# Patient Record
Sex: FEMALE | Race: WHITE | NOT HISPANIC OR LATINO | Employment: UNEMPLOYED | URBAN - METROPOLITAN AREA
[De-identification: names, ages, dates, MRNs, and addresses within clinical notes are randomized per-mention and may not be internally consistent; named-entity substitution may affect disease eponyms.]

---

## 2020-01-07 DIAGNOSIS — K21.9 GASTROESOPHAGEAL REFLUX DISEASE WITHOUT ESOPHAGITIS: Primary | ICD-10-CM

## 2020-01-07 RX ORDER — OMEPRAZOLE 20 MG/1
CAPSULE, DELAYED RELEASE ORAL
Qty: 90 CAPSULE | Refills: 0 | Status: SHIPPED | OUTPATIENT
Start: 2020-01-07 | End: 2020-04-07

## 2020-04-06 DIAGNOSIS — K21.9 GASTROESOPHAGEAL REFLUX DISEASE WITHOUT ESOPHAGITIS: ICD-10-CM

## 2020-04-07 RX ORDER — OMEPRAZOLE 20 MG/1
CAPSULE, DELAYED RELEASE ORAL
Qty: 90 CAPSULE | Refills: 3 | Status: SHIPPED | OUTPATIENT
Start: 2020-04-07 | End: 2021-04-01

## 2020-05-14 ENCOUNTER — TELEMEDICINE (OUTPATIENT)
Dept: GASTROENTEROLOGY | Facility: CLINIC | Age: 55
End: 2020-05-14
Payer: COMMERCIAL

## 2020-05-14 DIAGNOSIS — K21.9 GASTROESOPHAGEAL REFLUX DISEASE WITHOUT ESOPHAGITIS: Primary | ICD-10-CM

## 2020-05-14 DIAGNOSIS — Z86.010 HISTORY OF COLON POLYPS: ICD-10-CM

## 2020-05-14 PROCEDURE — 99243 OFF/OP CNSLTJ NEW/EST LOW 30: CPT | Performed by: PHYSICIAN ASSISTANT

## 2020-06-01 ENCOUNTER — LAB REQUISITION (OUTPATIENT)
Dept: LAB | Facility: HOSPITAL | Age: 55
End: 2020-06-01
Payer: COMMERCIAL

## 2020-06-01 DIAGNOSIS — K63.5 POLYP OF COLON: ICD-10-CM

## 2020-06-01 PROCEDURE — 88305 TISSUE EXAM BY PATHOLOGIST: CPT | Performed by: PATHOLOGY

## 2021-04-01 DIAGNOSIS — K21.9 GASTROESOPHAGEAL REFLUX DISEASE WITHOUT ESOPHAGITIS: ICD-10-CM

## 2021-04-01 RX ORDER — OMEPRAZOLE 20 MG/1
CAPSULE, DELAYED RELEASE ORAL
Qty: 90 CAPSULE | Refills: 3 | Status: SHIPPED | OUTPATIENT
Start: 2021-04-01 | End: 2022-03-29

## 2022-03-28 DIAGNOSIS — K21.9 GASTROESOPHAGEAL REFLUX DISEASE WITHOUT ESOPHAGITIS: ICD-10-CM

## 2022-03-29 RX ORDER — OMEPRAZOLE 20 MG/1
CAPSULE, DELAYED RELEASE ORAL
Qty: 90 CAPSULE | Refills: 3 | Status: SHIPPED | OUTPATIENT
Start: 2022-03-29 | End: 2022-05-04 | Stop reason: SDUPTHER

## 2022-05-04 ENCOUNTER — OFFICE VISIT (OUTPATIENT)
Dept: GASTROENTEROLOGY | Facility: CLINIC | Age: 57
End: 2022-05-04
Payer: COMMERCIAL

## 2022-05-04 VITALS
HEIGHT: 65 IN | DIASTOLIC BLOOD PRESSURE: 78 MMHG | WEIGHT: 180 LBS | BODY MASS INDEX: 29.99 KG/M2 | SYSTOLIC BLOOD PRESSURE: 118 MMHG | HEART RATE: 78 BPM | OXYGEN SATURATION: 96 %

## 2022-05-04 DIAGNOSIS — Z86.010 HISTORY OF COLON POLYPS: Primary | ICD-10-CM

## 2022-05-04 DIAGNOSIS — K21.9 GASTROESOPHAGEAL REFLUX DISEASE WITHOUT ESOPHAGITIS: ICD-10-CM

## 2022-05-04 PROCEDURE — 99213 OFFICE O/P EST LOW 20 MIN: CPT | Performed by: PHYSICIAN ASSISTANT

## 2022-05-04 RX ORDER — OMEPRAZOLE 20 MG/1
20 CAPSULE, DELAYED RELEASE ORAL DAILY
Qty: 90 CAPSULE | Refills: 3 | Status: SHIPPED | OUTPATIENT
Start: 2022-05-04

## 2022-05-04 RX ORDER — ASCORBIC ACID 500 MG
500 TABLET ORAL DAILY
COMMUNITY

## 2022-05-04 RX ORDER — MAGNESIUM GLUCONATE 30 MG(550)
30 TABLET ORAL 2 TIMES DAILY
COMMUNITY

## 2022-05-04 RX ORDER — UREA 10 %
1 LOTION (ML) TOPICAL
COMMUNITY

## 2022-05-04 NOTE — LETTER
May 4, 2022     MD Addison Gallagher [place] [id number]    Patient: Gurpreet Burns   YOB: 1965   Date of Visit: 5/4/2022       Dear Dr Mahad Flores: Thank you for referring Gurpreet Burns to me for evaluation  Below are my notes for this consultation  If you have questions, please do not hesitate to call me  I look forward to following your patient along with you  Sincerely,        Daija Duran PA-C        CC: No Recipients  Martin Frederick  5/4/2022  9:04 AM  Sign when Signing Visit  [facility] - Outpatient Follow-up Note  Gurpreet Burns 55 y o  female MRN: [id number]  Encounter: [id number]          ASSESSMENT AND PLAN:      1  Gastroesophageal reflux disease without esophagitis  2  History of colon polyps   -Will plan EGD and colonoscopy in 2023    -Omeprazole 20 mg daily refilled  -GERD handout given and reviewed  ______________________________________________________________________    SUBJECTIVE:    46-year-old female presents the office today for GI follow-up  Patient does suffer from chronic acid reflux disease and is currently on omeprazole 20 mg daily  Patient does need a medication refill  Patient reports she will  Have occasional breakthrough symptoms likely related to diet and stress  Patient denies any alarm symptoms  Patient denies any dysphagia or weight loss  Patient denies any abdominal pain, nausea, vomiting  Patient does have a history of colon polyps  Patient has had multiple colonoscopies with innumerable polyps  Patient is on a recall for 3 years  Patient is due for repeat colonoscopy in 2023  REVIEW OF SYSTEMS IS OTHERWISE NEGATIVE        Historical Information   Past Medical History:   Diagnosis Date    Colon polyps     GERD (gastroesophageal reflux disease)      Past Surgical History:   Procedure Laterality Date    CARPAL TUNNEL RELEASE      ORIF FEMUR FRACTURE Left Social History   Social History     Substance and Sexual Activity   Alcohol Use Yes     Social History     Substance and Sexual Activity   Drug Use Not on file     Social History     Tobacco Use   Smoking Status Current Every Day Smoker   Smokeless Tobacco Never Used     Family History   Problem Relation Age of Onset    Heart disease Father        Meds/Allergies       Current Outpatient Medications:     ascorbic acid (VITAMIN C) 500 MG tablet    calcium carbonate (OS-JE) 1250 (500 Ca) MG chewable tablet    Cholecalciferol 50 MCG (2000 UT) CAPS    Magnesium Gluconate 550 MG TABS    omeprazole (PriLOSEC) 20 mg delayed release capsule    Allergies   Allergen Reactions    Ferrous Sulfate Diarrhea     Explosive diarrhea            Objective     Blood pressure 118/78, pulse 78, height 5' 5" (1 651 m), weight 81 6 kg (180 lb), SpO2 96 %  Body mass index is 29 95 kg/m²  PHYSICAL EXAM:      General Appearance:   Alert, cooperative, no distress   HEENT:   Normocephalic, atraumatic, anicteric      Neck:  Supple, symmetrical, trachea midline   Lungs:   Clear to auscultation bilaterally; no rales, rhonchi or wheezing; respirations unlabored    Heart[de-identified]   Regular rate and rhythm; no murmur, rub, or gallop  Abdomen:   Soft, non-tender, non-distended; normal bowel sounds; no masses, no organomegaly    Genitalia:   Deferred    Rectal:   Deferred    Extremities:  No cyanosis, clubbing or edema    Pulses:  2+ and symmetric    Skin:  No jaundice, rashes, or lesions    Lymph nodes:  No palpable cervical lymphadenopathy        Lab Results:   No visits with results within 1 Day(s) from this visit     Latest known visit with results is:   Lab Requisition on 06/01/2020   Component Date Value    Case Report 06/01/2020                      Value:Surgical Pathology Report                         Case: [id number]                                   Authorizing Provider:  Brenda Wagner DO          Collected:           06/01/2020 [address] [facility] Location:     [address] [facility]      Received:            06/01/2020 [facility]                                                                                  Laboratory                                                                   Pathologist:           Manuel Moreno MD                                                                  Specimens:   A) - Polyp, Colorectal, Ascending colon                                                             B) - Polyp, Colorectal, Transverse colon                                                            C) - Polyp, Colorectal, Distal transverse colon                                                     D) - Polyp, Colorectal, Descending colon                                                                                      E) - Polyp, Colorectal, Proximal sigmoid colon                                                      F) - Polyp, Colorectal, Mid sigmoid colon                                                  Final Diagnosis 06/01/2020                      Value: This result contains rich text formatting which cannot be displayed here   Additional Information 06/01/2020                      Value: This result contains rich text formatting which cannot be displayed here   Synoptic Checklist 06/01/2020                      Value:  (COLON/RECTUM POLYP FORM-GI - A, E, F)                                                                                                                 :    Adenoma(s)      Gross Description 06/01/2020                      Value: This result contains rich text formatting which cannot be displayed here  Radiology Results:   No results found

## 2022-05-04 NOTE — PROGRESS NOTES
[facility] - Outpatient Follow-up Note  Alejandra Records 55 y o  female MRN: [id number]  Encounter: [id number]          ASSESSMENT AND PLAN:      1  Gastroesophageal reflux disease without esophagitis  2  History of colon polyps   -Will plan EGD and colonoscopy in 2023    -Omeprazole 20 mg daily refilled  -GERD handout given and reviewed  ______________________________________________________________________    SUBJECTIVE:    61-year-old female presents the office today for GI follow-up  Patient does suffer from chronic acid reflux disease and is currently on omeprazole 20 mg daily  Patient does need a medication refill  Patient reports she will  Have occasional breakthrough symptoms likely related to diet and stress  Patient denies any alarm symptoms  Patient denies any dysphagia or weight loss  Patient denies any abdominal pain, nausea, vomiting  Patient does have a history of colon polyps  Patient has had multiple colonoscopies with innumerable polyps  Patient is on a recall for 3 years  Patient is due for repeat colonoscopy in 2023  REVIEW OF SYSTEMS IS OTHERWISE NEGATIVE        Historical Information   Past Medical History:   Diagnosis Date    Colon polyps     GERD (gastroesophageal reflux disease)      Past Surgical History:   Procedure Laterality Date    CARPAL TUNNEL RELEASE      ORIF FEMUR FRACTURE Left      Social History   Social History     Substance and Sexual Activity   Alcohol Use Yes     Social History     Substance and Sexual Activity   Drug Use Not on file     Social History     Tobacco Use   Smoking Status Current Every Day Smoker   Smokeless Tobacco Never Used     Family History   Problem Relation Age of Onset    Heart disease Father        Meds/Allergies       Current Outpatient Medications:     ascorbic acid (VITAMIN C) 500 MG tablet    calcium carbonate (OS-JE) 1250 (500 Ca) MG chewable tablet    Cholecalciferol 50 MCG (2000 UT) CAPS   Magnesium Gluconate 550 MG TABS    omeprazole (PriLOSEC) 20 mg delayed release capsule    Allergies   Allergen Reactions    Ferrous Sulfate Diarrhea     Explosive diarrhea            Objective     Blood pressure 118/78, pulse 78, height 5' 5" (1 651 m), weight 81 6 kg (180 lb), SpO2 96 %  Body mass index is 29 95 kg/m²  PHYSICAL EXAM:      General Appearance:   Alert, cooperative, no distress   HEENT:   Normocephalic, atraumatic, anicteric      Neck:  Supple, symmetrical, trachea midline   Lungs:   Clear to auscultation bilaterally; no rales, rhonchi or wheezing; respirations unlabored    Heart[de-identified]   Regular rate and rhythm; no murmur, rub, or gallop  Abdomen:   Soft, non-tender, non-distended; normal bowel sounds; no masses, no organomegaly    Genitalia:   Deferred    Rectal:   Deferred    Extremities:  No cyanosis, clubbing or edema    Pulses:  2+ and symmetric    Skin:  No jaundice, rashes, or lesions    Lymph nodes:  No palpable cervical lymphadenopathy        Lab Results:   No visits with results within 1 Day(s) from this visit     Latest known visit with results is:   Lab Requisition on 06/01/2020   Component Date Value    Case Report 06/01/2020                      Value:Surgical Pathology Report                         Case: [id number]                                   Authorizing Provider:  Angel Ferguson DO          Collected:           06/01/2020 7903              Ordering Location:     [facility]      Received:            06/01/2020 [facility]                                                                                  Laboratory                                                                   Pathologist:           Vin Martinez MD                                                                  Specimens:   A) - Polyp, Colorectal, Ascending colon                                                             B) - Polyp, Colorectal, Transverse colon                                                            C) - Polyp, Colorectal, Distal transverse colon                                                     D) - Polyp, Colorectal, Descending colon                                                                                      E) - Polyp, Colorectal, Proximal sigmoid colon                                                      F) - Polyp, Colorectal, Mid sigmoid colon                                                  Final Diagnosis 06/01/2020                      Value: This result contains rich text formatting which cannot be displayed here   Additional Information 06/01/2020                      Value: This result contains rich text formatting which cannot be displayed here   Synoptic Checklist 06/01/2020                      Value:  (COLON/RECTUM POLYP FORM-GI - A, E, F)                                                                                                                 :    Adenoma(s)      Gross Description 06/01/2020                      Value: This result contains rich text formatting which cannot be displayed here  Radiology Results:   No results found

## 2022-05-04 NOTE — PATIENT INSTRUCTIONS
GERD (Gastroesophageal Reflux Disease)   WHAT YOU NEED TO KNOW:   Gastroesophageal reflux disease (GERD) is reflux that happens more than 2 times a week for a few weeks  Reflux means acid and food in your stomach back up into your esophagus  GERD can cause other health problems over time if it is not treated  DISCHARGE INSTRUCTIONS:   Call your local emergency number (911 in the [address] [facility]) if:   · You have severe chest pain and sudden trouble breathing  Return to the emergency department if:   · You have trouble breathing after you vomit  · You have trouble swallowing, or pain with swallowing  · Your bowel movements are black, bloody, or tarry-looking  · Your vomit looks like coffee grounds or has blood in it  Call your doctor or gastroenterologist if:   · You feel full and cannot burp or vomit  · You vomit large amounts, or you vomit often  · You are losing weight without trying  · Your symptoms get worse or do not improve with treatment  · You have questions or concerns about your condition or care  Medicines:   · Medicines  are used to decrease stomach acid  Medicine may also be used to help your lower esophageal sphincter and stomach contract (tighten) more  · Take your medicine as directed  Contact your healthcare provider if you think your medicine is not helping or if you have side effects  Tell him of her if you are allergic to any medicine  Keep a list of the medicines, vitamins, and herbs you take  Include the amounts, and when and why you take them  Bring the list or the pill bottles to follow-up visits  Carry your medicine list with you in case of an emergency  Manage GERD:       · Do not have foods or drinks that may increase heartburn  These include chocolate, peppermint, fried or fatty foods, drinks that contain caffeine, or carbonated drinks (soda)  Other foods include spicy foods, onions, tomatoes, and tomato-based foods   Do not have foods or drinks that can irritate your esophagus, such as citrus fruits, juices, and alcohol  · Do not eat large meals  When you eat a lot of food at one time, your stomach needs more acid to digest it  Eat 6 small meals each day instead of 3 large ones, and eat slowly  Do not eat meals 2 to 3 hours before bedtime  · Elevate the head of your bed  Place 6-inch blocks under the head of your bed frame  You may also use more than one pillow under your head and shoulders while you sleep  · Maintain a healthy weight  If you are overweight, weight loss may help relieve symptoms of GERD  · Do not smoke  Smoking weakens the lower esophageal sphincter and increases the risk of GERD  Ask your healthcare provider for information if you currently smoke and need help to quit  E-cigarettes or smokeless tobacco still contain nicotine  Talk to your healthcare provider before you use these products  · Do not put pressure on your abdomen  Pressure pushes acid up into your esophagus  Do not wear clothing that is tight around your waist  Do not bend over  Bend at the knees if you need to pick something up  Follow up with your doctor or gastroenterologist as directed:  Write down your questions so you remember to ask them during your visits  © Copyright [employer] 2022 Information is for End User's use only and may not be sold, redistributed or otherwise used for commercial purposes  All illustrations and images included in CareNotes® are the copyrighted property of [employer]  or Norma Olson  The above information is an  only  It is not intended as medical advice for individual conditions or treatments  Talk to your doctor, nurse or pharmacist before following any medical regimen to see if it is safe and effective for you

## 2023-05-05 ENCOUNTER — OFFICE VISIT (OUTPATIENT)
Dept: GASTROENTEROLOGY | Facility: CLINIC | Age: 58
End: 2023-05-05

## 2023-05-05 VITALS
HEIGHT: 65 IN | WEIGHT: 184 LBS | BODY MASS INDEX: 30.66 KG/M2 | DIASTOLIC BLOOD PRESSURE: 86 MMHG | HEART RATE: 68 BPM | OXYGEN SATURATION: 96 % | SYSTOLIC BLOOD PRESSURE: 133 MMHG

## 2023-05-05 DIAGNOSIS — Z86.010 HISTORY OF COLON POLYPS: ICD-10-CM

## 2023-05-05 DIAGNOSIS — K21.9 GASTROESOPHAGEAL REFLUX DISEASE WITHOUT ESOPHAGITIS: Primary | ICD-10-CM

## 2023-05-05 RX ORDER — SODIUM FLUORIDE1.1%, POTASSIUM NITRATE 5% 5.8; 57.5 MG/ML; MG/ML
GEL, DENTIFRICE DENTAL 2 TIMES DAILY
COMMUNITY
Start: 2023-04-10

## 2023-05-05 RX ORDER — ROSUVASTATIN CALCIUM 5 MG/1
TABLET, COATED ORAL
COMMUNITY
Start: 2023-04-18

## 2023-05-05 RX ORDER — OMEPRAZOLE 20 MG/1
20 CAPSULE, DELAYED RELEASE ORAL 2 TIMES DAILY
Qty: 180 CAPSULE | Refills: 3 | Status: SHIPPED | OUTPATIENT
Start: 2023-05-05

## 2023-05-05 NOTE — PROGRESS NOTES
[facility] - Outpatient Consultation  Palomino Macho 65 y o  female MRN: [id number]  Encounter: [id number]          ASSESSMENT AND PLAN:      1  Gastroesophageal reflux disease without esophagitis  2  History of colon polyps  -Patient will be scheduled for an EGD and colonoscopy at this time   -Will plan right upper quadrant ultrasound   -Patient will increase omeprazole 20 mg to twice daily dosage  -GERD dietary modifications  -Encourage plenty of water intake     -Patient will schedule a follow-up appointment after endoscopic evaluation is complete   ______________________________________________________________________    HPI:   60-year-old female with a past medical history significant for colon polyps as well as gastroesophageal reflux disease presents to the GI clinic today for consultation prior to an EGD and colonoscopy  Patient is due for routine EGD and colonoscopy due to a history of GERD as well as colon polyps  Patient reports that most recently since October she has really been suffering with her stomach  She does have acid reflux and is maintained on omeprazole 20 mg daily  Unfortunately patient reports that this does not completely control her symptoms  She reports that there are no specific dietary triggers  She denies any dysphagia  She denies any significant episodes of regurgitation  She denies any melena or rectal bleeding  She denies any hematemesis  Patient's last colonoscopy was performed in June 2020  Patient did have several polyps removed pathology was consistent with hyperplastic polyps as well as 2 tubular adenomas  REVIEW OF SYSTEMS:    CONSTITUTIONAL: Denies any fever, chills, rigors, and weight loss  HEENT: No earache or tinnitus  Denies hearing loss or visual disturbances  CARDIOVASCULAR: No chest pain or palpitations  RESPIRATORY: Denies any cough, hemoptysis, shortness of breath or dyspnea on exertion    GASTROINTESTINAL: As "noted in the History of Present Illness  GENITOURINARY: No problems with urination  Denies any hematuria or dysuria  NEUROLOGIC: No dizziness or vertigo, denies headaches  MUSCULOSKELETAL: Denies any muscle or joint pain  SKIN: Denies skin rashes or itching  ENDOCRINE: Denies excessive thirst  Denies intolerance to heat or cold  PSYCHOSOCIAL: Denies depression or anxiety  Denies any recent memory loss  Historical Information   Past Medical History:   Diagnosis Date    Colon polyps     GERD (gastroesophageal reflux disease)      Past Surgical History:   Procedure Laterality Date    CARPAL TUNNEL RELEASE      ORIF FEMUR FRACTURE Left      Social History   Social History     Substance and Sexual Activity   Alcohol Use Yes     Social History     Substance and Sexual Activity   Drug Use Not on file     Social History     Tobacco Use   Smoking Status Every Day   Smokeless Tobacco Never     Family History   Problem Relation Age of Onset    Heart disease Father        Meds/Allergies       Current Outpatient Medications:     ascorbic acid (VITAMIN C) 500 MG tablet    calcium carbonate (OS-JE) 1250 (500 Ca) MG chewable tablet    Cholecalciferol 50 MCG (2000 UT) CAPS    Magnesium Gluconate 550 MG TABS    omeprazole (PriLOSEC) 20 mg delayed release capsule    rosuvastatin (CRESTOR) 5 mg tablet    Sodium Fluoride 5000 Sensitive 1 1-5 % GEL    Allergies   Allergen Reactions    Ferrous Sulfate Diarrhea     Explosive diarrhea            Objective     Blood pressure 133/86, pulse 68, height 5' 5\" (1 651 m), weight 83 5 kg (184 lb), SpO2 96 %  Body mass index is 30 62 kg/m²          PHYSICAL EXAM:      General Appearance:   Alert, cooperative, no distress   HEENT:   Normocephalic, atraumatic, anicteric      Neck:  Supple, symmetrical, trachea midline   Lungs:   Clear to auscultation bilaterally; no rales, rhonchi or wheezing; respirations unlabored    Heart[de-identified]   Regular rate and rhythm; no murmur, rub, or " gallop  Abdomen:   Soft, non-tender, non-distended; normal bowel sounds; no masses, no organomegaly    Genitalia:   Deferred    Rectal:   Deferred    Extremities:  No cyanosis, clubbing or edema    Pulses:  2+ and symmetric    Skin:  No jaundice, rashes, or lesions    Lymph nodes:  No palpable cervical lymphadenopathy        Lab Results:   No visits with results within 1 Day(s) from this visit  Latest known visit with results is:   Lab Requisition on 06/01/2020   Component Date Value    Case Report 06/01/2020                      Value:Surgical Pathology Report                         Case: [id number]                                   Authorizing Provider:  Hollis Esqueda DO          Collected:           06/01/2020 1234              Ordering Location:     [address] [place] [address]      Received:            06/01/2020 [facility]                                                                                  Laboratory                                                                   Pathologist:           Rhiannon Cline MD                                                                  Specimens:   A) - Polyp, Colorectal, Ascending colon                                                             B) - Polyp, Colorectal, Transverse colon                                                            C) - Polyp, Colorectal, Distal transverse colon                                                     D) - Polyp, Colorectal, Descending colon                                                                                      E) - Polyp, Colorectal, Proximal sigmoid colon                                                      F) - Polyp, Colorectal, Mid sigmoid colon                                                  Final Diagnosis 06/01/2020                      Value: This result contains rich text formatting which cannot be displayed here      Additional Information 06/01/2020 Value:This result contains rich text formatting which cannot be displayed here   Synoptic Checklist 06/01/2020                      Value:  (COLON/RECTUM POLYP FORM-GI - A, E, F)                                                                                                                 :    Adenoma(s)      Gross Description 06/01/2020                      Value: This result contains rich text formatting which cannot be displayed here  Radiology Results:   No results found

## 2023-05-05 NOTE — LETTER
May 5, 2023     MD [place] [id number]    Patient: Paul Joyce   YOB: 1965   Date of Visit: 5/5/2023       Dear Dr Ying Fong: Thank you for referring Paul Joyce to me for evaluation  Below are my notes for this consultation  If you have questions, please do not hesitate to call me  I look forward to following your patient along with you  Sincerely,        Kira Damon PA-C        CC: No Recipients  Kira Damon PA-C  5/5/2023  9:29 AM  Sign when Signing Visit  [facility] - Outpatient Consultation  Paul Joyce 65 y o  female MRN: [id number]  Encounter: [id number]          ASSESSMENT AND PLAN:      1  Gastroesophageal reflux disease without esophagitis  2  History of colon polyps  -Patient will be scheduled for an EGD and colonoscopy at this time   -Will plan right upper quadrant ultrasound   -Patient will increase omeprazole 20 mg to twice daily dosage  -GERD dietary modifications  -Encourage plenty of water intake     -Patient will schedule a follow-up appointment after endoscopic evaluation is complete   ______________________________________________________________________    HPI:   60-year-old female with a past medical history significant for colon polyps as well as gastroesophageal reflux disease presents to the GI clinic today for consultation prior to an EGD and colonoscopy  Patient is due for routine EGD and colonoscopy due to a history of GERD as well as colon polyps  Patient reports that most recently since October she has really been suffering with her stomach  She does have acid reflux and is maintained on omeprazole 20 mg daily  Unfortunately patient reports that this does not completely control her symptoms  She reports that there are no specific dietary triggers  She denies any dysphagia  She denies any significant episodes of regurgitation    She denies any melena or rectal bleeding  She denies any hematemesis  Patient's last colonoscopy was performed in June 2020  Patient did have several polyps removed pathology was consistent with hyperplastic polyps as well as 2 tubular adenomas  REVIEW OF SYSTEMS:    CONSTITUTIONAL: Denies any fever, chills, rigors, and weight loss  HEENT: No earache or tinnitus  Denies hearing loss or visual disturbances  CARDIOVASCULAR: No chest pain or palpitations  RESPIRATORY: Denies any cough, hemoptysis, shortness of breath or dyspnea on exertion  GASTROINTESTINAL: As noted in the History of Present Illness  GENITOURINARY: No problems with urination  Denies any hematuria or dysuria  NEUROLOGIC: No dizziness or vertigo, denies headaches  MUSCULOSKELETAL: Denies any muscle or joint pain  SKIN: Denies skin rashes or itching  ENDOCRINE: Denies excessive thirst  Denies intolerance to heat or cold  PSYCHOSOCIAL: Denies depression or anxiety  Denies any recent memory loss         Historical Information   Past Medical History:   Diagnosis Date    Colon polyps     GERD (gastroesophageal reflux disease)      Past Surgical History:   Procedure Laterality Date    CARPAL TUNNEL RELEASE      ORIF FEMUR FRACTURE Left      Social History   Social History     Substance and Sexual Activity   Alcohol Use Yes     Social History     Substance and Sexual Activity   Drug Use Not on file     Social History     Tobacco Use   Smoking Status Every Day   Smokeless Tobacco Never     Family History   Problem Relation Age of Onset    Heart disease Father        Meds/Allergies        Current Outpatient Medications:     ascorbic acid (VITAMIN C) 500 MG tablet    calcium carbonate (OS-JE) 1250 (500 Ca) MG chewable tablet    Cholecalciferol 50 MCG (2000 UT) CAPS    Magnesium Gluconate 550 MG TABS    omeprazole (PriLOSEC) 20 mg delayed release capsule    rosuvastatin (CRESTOR) 5 mg tablet    Sodium Fluoride 5000 Sensitive 1 1-5 % "GEL    Allergies   Allergen Reactions    Ferrous Sulfate Diarrhea     Explosive diarrhea            Objective      Blood pressure 133/86, pulse 68, height 5' 5\" (1 651 m), weight 83 5 kg (184 lb), SpO2 96 %  Body mass index is 30 62 kg/m²  PHYSICAL EXAM:      General Appearance:   Alert, cooperative, no distress   HEENT:   Normocephalic, atraumatic, anicteric      Neck:  Supple, symmetrical, trachea midline   Lungs:   Clear to auscultation bilaterally; no rales, rhonchi or wheezing; respirations unlabored    Heart[de-identified]   Regular rate and rhythm; no murmur, rub, or gallop  Abdomen:   Soft, non-tender, non-distended; normal bowel sounds; no masses, no organomegaly    Genitalia:   Deferred    Rectal:   Deferred    Extremities:  No cyanosis, clubbing or edema    Pulses:  2+ and symmetric    Skin:  No jaundice, rashes, or lesions    Lymph nodes:  No palpable cervical lymphadenopathy        Lab Results:   No visits with results within 1 Day(s) from this visit     Latest known visit with results is:   Lab Requisition on 06/01/2020   Component Date Value    Case Report 06/01/2020                      Value:Surgical Pathology Report                         Case: [id number]                                   Authorizing Provider:  Arjun Jenkins DO          Collected:           06/01/2020 [id number]              Ordering Location:     [address]      Received:            06/01/2020 [place] [address] [facility]                                                                                  Laboratory                                                                   Pathologist:           Nidhi Hagen MD                                                                  Specimens:   A) - Polyp, Colorectal, Ascending colon                                                             B) - Polyp, Colorectal, Transverse colon                                                            C) - Polyp, " Colorectal, Distal transverse colon                                                     D) - Polyp, Colorectal, Descending colon                                                                                      E) - Polyp, Colorectal, Proximal sigmoid colon                                                      F) - Polyp, Colorectal, Mid sigmoid colon                                                  Final Diagnosis 06/01/2020                      Value: This result contains rich text formatting which cannot be displayed here   Additional Information 06/01/2020                      Value: This result contains rich text formatting which cannot be displayed here   Synoptic Checklist 06/01/2020                      Value:  (COLON/RECTUM POLYP FORM-GI - A, E, F)                                                                                                                 :    Adenoma(s)      Gross Description 06/01/2020                      Value: This result contains rich text formatting which cannot be displayed here  Radiology Results:   No results found

## 2023-05-05 NOTE — PATIENT INSTRUCTIONS
Scheduled date of EGD/colonoscopy (as of today):7/17/23  Physician performing EGD/colonoscopy:Berenice  Location of EGD/colonoscopy:[facility]  Desired bowel prep reviewed with patient:Gail/Miralax  Instructions reviewed with patient by:Arya de santiago  Clearances:   none

## 2023-07-11 ENCOUNTER — TELEPHONE (OUTPATIENT)
Dept: GASTROENTEROLOGY | Facility: CLINIC | Age: 58
End: 2023-07-11

## 2023-07-17 ENCOUNTER — ANESTHESIA (OUTPATIENT)
Dept: GASTROENTEROLOGY | Facility: HOSPITAL | Age: 58
End: 2023-07-17

## 2023-07-17 ENCOUNTER — ANESTHESIA EVENT (OUTPATIENT)
Dept: GASTROENTEROLOGY | Facility: HOSPITAL | Age: 58
End: 2023-07-17

## 2023-07-17 ENCOUNTER — HOSPITAL ENCOUNTER (OUTPATIENT)
Dept: GASTROENTEROLOGY | Facility: HOSPITAL | Age: 58
Setting detail: OUTPATIENT SURGERY
Discharge: HOME | End: 2023-07-17
Payer: COMMERCIAL

## 2023-07-17 VITALS
OXYGEN SATURATION: 98 % | BODY MASS INDEX: 29.38 KG/M2 | WEIGHT: 176.37 LBS | RESPIRATION RATE: 18 BRPM | HEART RATE: 60 BPM | TEMPERATURE: 97.6 F | HEIGHT: 65 IN | SYSTOLIC BLOOD PRESSURE: 139 MMHG | DIASTOLIC BLOOD PRESSURE: 90 MMHG

## 2023-07-17 DIAGNOSIS — K21.9 GASTROESOPHAGEAL REFLUX DISEASE WITHOUT ESOPHAGITIS: ICD-10-CM

## 2023-07-17 DIAGNOSIS — Z86.010 HISTORY OF COLON POLYPS: ICD-10-CM

## 2023-07-17 PROCEDURE — 43239 EGD BIOPSY SINGLE/MULTIPLE: CPT | Performed by: INTERNAL MEDICINE

## 2023-07-17 PROCEDURE — 88305 TISSUE EXAM BY PATHOLOGIST: CPT | Performed by: SPECIALIST

## 2023-07-17 PROCEDURE — 45380 COLONOSCOPY AND BIOPSY: CPT | Performed by: INTERNAL MEDICINE

## 2023-07-17 PROCEDURE — 45385 COLONOSCOPY W/LESION REMOVAL: CPT | Performed by: INTERNAL MEDICINE

## 2023-07-17 RX ORDER — PROPOFOL 10 MG/ML
INJECTION, EMULSION INTRAVENOUS AS NEEDED
Status: DISCONTINUED | OUTPATIENT
Start: 2023-07-17 | End: 2023-07-17

## 2023-07-17 RX ORDER — SODIUM CHLORIDE, SODIUM LACTATE, POTASSIUM CHLORIDE, CALCIUM CHLORIDE 600; 310; 30; 20 MG/100ML; MG/100ML; MG/100ML; MG/100ML
INJECTION, SOLUTION INTRAVENOUS CONTINUOUS PRN
Status: DISCONTINUED | OUTPATIENT
Start: 2023-07-17 | End: 2023-07-17

## 2023-07-17 RX ORDER — LIDOCAINE HYDROCHLORIDE 20 MG/ML
INJECTION, SOLUTION EPIDURAL; INFILTRATION; INTRACAUDAL; PERINEURAL AS NEEDED
Status: DISCONTINUED | OUTPATIENT
Start: 2023-07-17 | End: 2023-07-17

## 2023-07-17 RX ADMIN — PROPOFOL 60 MG: 10 INJECTION, EMULSION INTRAVENOUS at 09:16

## 2023-07-17 RX ADMIN — PROPOFOL 20 MG: 10 INJECTION, EMULSION INTRAVENOUS at 08:49

## 2023-07-17 RX ADMIN — PROPOFOL 20 MG: 10 INJECTION, EMULSION INTRAVENOUS at 08:47

## 2023-07-17 RX ADMIN — LIDOCAINE HYDROCHLORIDE 40 MG: 20 INJECTION, SOLUTION EPIDURAL; INFILTRATION; INTRACAUDAL; PERINEURAL at 08:45

## 2023-07-17 RX ADMIN — SODIUM CHLORIDE, SODIUM LACTATE, POTASSIUM CHLORIDE, AND CALCIUM CHLORIDE: .6; .31; .03; .02 INJECTION, SOLUTION INTRAVENOUS at 08:30

## 2023-07-17 RX ADMIN — PROPOFOL 60 MG: 10 INJECTION, EMULSION INTRAVENOUS at 09:04

## 2023-07-17 RX ADMIN — PROPOFOL 20 MG: 10 INJECTION, EMULSION INTRAVENOUS at 08:59

## 2023-07-17 RX ADMIN — PROPOFOL 20 MG: 10 INJECTION, EMULSION INTRAVENOUS at 08:57

## 2023-07-17 RX ADMIN — PROPOFOL 100 MG: 10 INJECTION, EMULSION INTRAVENOUS at 08:45

## 2023-07-17 RX ADMIN — PROPOFOL 20 MG: 10 INJECTION, EMULSION INTRAVENOUS at 08:55

## 2023-07-17 RX ADMIN — PROPOFOL 20 MG: 10 INJECTION, EMULSION INTRAVENOUS at 08:51

## 2023-07-17 RX ADMIN — LIDOCAINE HYDROCHLORIDE 60 MG: 20 INJECTION, SOLUTION EPIDURAL; INFILTRATION; INTRACAUDAL; PERINEURAL at 08:40

## 2023-07-17 RX ADMIN — PROPOFOL 20 MG: 10 INJECTION, EMULSION INTRAVENOUS at 08:53

## 2023-07-17 RX ADMIN — PROPOFOL 100 MG: 10 INJECTION, EMULSION INTRAVENOUS at 09:08

## 2023-07-17 RX ADMIN — SODIUM CHLORIDE, SODIUM LACTATE, POTASSIUM CHLORIDE, AND CALCIUM CHLORIDE: .6; .31; .03; .02 INJECTION, SOLUTION INTRAVENOUS at 09:16

## 2023-07-17 NOTE — ANESTHESIA PREPROCEDURE EVALUATION
Procedure:  COLONOSCOPY  EGD    Relevant Problems   No relevant active problems      GERD (gastroesophageal reflux disease)    Colon polyps        Physical Exam    Airway    Mallampati score: II  TM Distance: >3 FB  Neck ROM: full     Dental       Cardiovascular  Cardiovascular exam normal    Pulmonary  Pulmonary exam normal     Other Findings        Anesthesia Plan  ASA Score- 2     Anesthesia Type- IV sedation with anesthesia with ASA Monitors. Additional Monitors:   Airway Plan:           Plan Factors-Exercise tolerance (METS): >4 METS. Chart reviewed. EKG reviewed. Imaging results reviewed. Existing labs reviewed. Patient summary reviewed. Induction- intravenous. Postoperative Plan-     Informed Consent- Anesthetic plan and risks discussed with patient. I personally reviewed this patient with the CRNA. Discussed and agreed on the Anesthesia Plan with the CRNA. Darek Caruso

## 2023-07-17 NOTE — H&P
History and Physical - [facility]  Sergei Dsouza 63 y.o. female MRN: [id number]      HPI: Sergei Dsouza is a 63y.o. year old female who presents for evaluation of gastroesophageal reflux disease, history of colon polyps      REVIEW OF SYSTEMS: Per the HPI, and otherwise unremarkable. Historical Information   Past Medical History:   Diagnosis Date   • Colon polyps    • GERD (gastroesophageal reflux disease)      Past Surgical History:   Procedure Laterality Date   • CARPAL TUNNEL RELEASE     • ORIF FEMUR FRACTURE Left      Social History   Social History     Substance and Sexual Activity   Alcohol Use Yes     Social History     Substance and Sexual Activity   Drug Use Not Currently     Social History     Tobacco Use   Smoking Status Every Day   Smokeless Tobacco Never     Family History   Problem Relation Age of Onset   • Heart disease Father        Meds/Allergies     (Not in a hospital admission)      Allergies   Allergen Reactions   • Ferrous Sulfate Diarrhea     Explosive diarrhea        Objective     Blood pressure 135/88, pulse 99, temperature 98.2 °F (36.8 °C), temperature source Oral, resp. rate 16, height 5' 5" (1.651 m), weight 80 kg (176 lb 5.9 oz), SpO2 96 %. PHYSICAL EXAM    Gen: NAD  CV: RRR  CHEST: Clear  ABD: soft, NT/ND  EXT: no edema      ASSESSMENT/PLAN:  This is a 63y.o. year old female here for EGD, colonoscopy, and she is stable and optimized for her procedure.

## 2023-07-17 NOTE — ANESTHESIA POSTPROCEDURE EVALUATION
Post-Op Assessment Note    CV Status:  Stable    Pain management: adequate     Mental Status:  Alert and awake   Hydration Status:  Euvolemic   PONV Controlled:  Controlled   Airway Patency:  Patent      Post Op Vitals Reviewed: Yes      Staff: Anesthesiologist, CRNA         There were no known notable events for this encounter.     /61 (07/17/23 0922)    Temp 97.6 °F (36.4 °C) (07/17/23 0922)    Pulse 63 (07/17/23 0922)   Resp 16 (07/17/23 0922)    SpO2 94 % (07/17/23 0922)

## 2023-07-20 PROCEDURE — 88305 TISSUE EXAM BY PATHOLOGIST: CPT | Performed by: SPECIALIST

## 2023-07-21 ENCOUNTER — TELEPHONE (OUTPATIENT)
Dept: GASTROENTEROLOGY | Facility: CLINIC | Age: 58
End: 2023-07-21

## 2023-07-21 NOTE — TELEPHONE ENCOUNTER
Called and spoke with patient in regards to her biopsy results as per Dr. Helen Sánchez. Pt voice understanding. EKG/Labs/Imaging Studies

## 2023-07-21 NOTE — TELEPHONE ENCOUNTER
----- Message from Nick Tello DO sent at 7/21/2023  7:22 AM EDT -----  Please call the patient with the biopsy results. Biopsies stomach were benign and negative for Helicobacter pylori. The polyps of the colon were mostly hyperplastic polyps however there were several tubular adenomas as well. This patient due for repeat colonoscopy in 3 years rather than 1 year. This is due to the fact that most of these polyps were benign hyperplastic polyps.

## 2023-07-25 ENCOUNTER — OFFICE VISIT (OUTPATIENT)
Dept: GASTROENTEROLOGY | Facility: CLINIC | Age: 58
End: 2023-07-25
Payer: COMMERCIAL

## 2023-07-25 VITALS
DIASTOLIC BLOOD PRESSURE: 82 MMHG | WEIGHT: 180.4 LBS | BODY MASS INDEX: 30.06 KG/M2 | HEART RATE: 64 BPM | SYSTOLIC BLOOD PRESSURE: 126 MMHG | HEIGHT: 65 IN | OXYGEN SATURATION: 97 %

## 2023-07-25 DIAGNOSIS — K21.9 GASTROESOPHAGEAL REFLUX DISEASE WITHOUT ESOPHAGITIS: ICD-10-CM

## 2023-07-25 DIAGNOSIS — Z86.010 HISTORY OF COLON POLYPS: Primary | ICD-10-CM

## 2023-07-25 PROCEDURE — 99213 OFFICE O/P EST LOW 20 MIN: CPT | Performed by: PHYSICIAN ASSISTANT

## 2023-07-25 RX ORDER — OMEPRAZOLE 20 MG/1
20 CAPSULE, DELAYED RELEASE ORAL 2 TIMES DAILY
Qty: 180 CAPSULE | Refills: 3 | Status: SHIPPED | OUTPATIENT
Start: 2023-07-25

## 2023-07-25 NOTE — PROGRESS NOTES
[facility] - Outpatient Follow-up Note  Les Pritchett 63 y.o. female MRN: [id number]  Encounter: [id number]          ASSESSMENT AND PLAN:      1. Gastroesophageal reflux disease without esophagitis  2. History of colon polyps  -Patient will continue omeprazole 20 mg twice a day. -GERD handout given. -Patient will need a recall colonoscopy in 3 years.    -Follow-up in the office in 1 year.  ______________________________________________________________________    SUBJECTIVE:    55-year-old female with a past medical history significant for colon polyps, GERD, and hypercholesterolemia presents to the GI clinic today for follow-up. Patient did undergo an EGD and a colonoscopy on July 17, 2023. Biopsies of patient's stomach were benign for Helicobacter pylori. Patient did have a multitude of colon polyps removed. Some polyps were hyperplastic and some polyps were consistent with tubular adenomas. Patient is due for recall colonoscopy in 1 year. Patient is currently maintained on omeprazole 20 mg twice a day. She reports that this is really controlling her acid reflux symptoms. She reports that occasionally stress will aggravate her acid reflux as well as certain foods but she is trying to follow a GERD diet. Patient denies any abdominal pain. She reports occasional nausea likely secondary to foods that she eats. She denies any vomiting. She denies any diarrhea or constipation. She denies any melena or rectal bleeding. REVIEW OF SYSTEMS IS OTHERWISE NEGATIVE.       Historical Information   Past Medical History:   Diagnosis Date   • Colon polyps    • GERD (gastroesophageal reflux disease)      Past Surgical History:   Procedure Laterality Date   • CARPAL TUNNEL RELEASE     • ORIF FEMUR FRACTURE Left      Social History   Social History     Substance and Sexual Activity   Alcohol Use Yes     Social History     Substance and Sexual Activity   Drug Use Not Currently Social History     Tobacco Use   Smoking Status Every Day   Smokeless Tobacco Never     Family History   Problem Relation Age of Onset   • Heart disease Father        Meds/Allergies       Current Outpatient Medications:   •  ascorbic acid (VITAMIN C) 500 MG tablet  •  calcium carbonate (OS-JE) 1250 (500 Ca) MG chewable tablet  •  Cholecalciferol 50 MCG (2000 UT) CAPS  •  Magnesium Gluconate 550 MG TABS  •  omeprazole (PriLOSEC) 20 mg delayed release capsule  •  rosuvastatin (CRESTOR) 5 mg tablet  •  Sodium Fluoride 5000 Sensitive 1.1-5 % GEL    Allergies   Allergen Reactions   • Ferrous Sulfate Diarrhea     Explosive diarrhea            Objective     Blood pressure 126/82, pulse 64, height 5' 5" (1.651 m), weight 81.8 kg (180 lb 6.4 oz), SpO2 97 %. Body mass index is 30.02 kg/m². PHYSICAL EXAM:      General Appearance:   Alert, cooperative, no distress   HEENT:   Normocephalic, atraumatic, anicteric.     Neck:  Supple, symmetrical, trachea midline   Lungs:   Clear to auscultation bilaterally; no rales, rhonchi or wheezing; respirations unlabored    Heart[de-identified]   Regular rate and rhythm; no murmur, rub, or gallop. Abdomen:   Soft, non-tender, non-distended; normal bowel sounds; no masses, no organomegaly    Genitalia:   Deferred    Rectal:   Deferred    Extremities:  No cyanosis, clubbing or edema    Pulses:  2+ and symmetric    Skin:  No jaundice, rashes, or lesions    Lymph nodes:  No palpable cervical lymphadenopathy        Lab Results:   No visits with results within 1 Day(s) from this visit.    Latest known visit with results is:   Hospital Outpatient Visit on 07/17/2023   Component Date Value   • Case Report 07/17/2023                      Value:Surgical Pathology Report                         Case: [id number]                                   Authorizing Provider:  Wilfrid Hurtado DO          Collected:           07/17/2023 9001              Ordering Location:      [facility]       Received: 07/17/2023 [address] [place] [address] Endoscopy                                                             Pathologist:           Yojana Schmitz MD                                                     Specimens:   A) - Stomach, antrum                                                                                B) - Polyp, Colorectal, transverse                                                                  C) - Polyp, Colorectal, descending x6 polyps                                                        D) - Polyp, Colorectal, proximal sigmoid x5 polyps                                                  E) - Polyp, Colorectal, 40cm x 3 polyps                                                                                       F) - Polyp, Colorectal, 30cm x 2 polyps                                                   • Final Diagnosis 07/17/2023                      Value: This result contains rich text formatting which cannot be displayed here. • Additional Information 07/17/2023                      Value: This result contains rich text formatting which cannot be displayed here. • Synoptic Checklist 07/17/2023                      Value:                            COLON/RECTUM POLYP FORM - GI - E                                                                                     :    Adenoma(s)     • Gross Description 07/17/2023                      Value: This result contains rich text formatting which cannot be displayed here. • Clinical Information 07/17/2023                      Value:Cold bx eval h pylori         Radiology Results:   Colonoscopy    Result Date: 7/17/2023  Narrative: Table formatting from the original result was not included.   [address] [place] [address] Endoscopy 2021 [facility] [address] [phone] DATE OF SERVICE: 7/17/23 PHYSICIAN(S): Attending: Arnoldo Benites DO Fellow: No Staff Documented INDICATION: Gastroesophageal reflux disease without esophagitis, History of colon polyps POST-OP DIAGNOSIS: See the impression below. HISTORY: Prior colonoscopy: 5 years ago. BOWEL PREPARATION: Miralax/Dulcolax PREPROCEDURE: Informed consent was obtained for the procedure, including sedation. Risks including but not limited to bleeding, infection, perforation, adverse drug reaction and aspiration were explained in detail. Also explained about less than 100% sensitivity with the exam and other alternatives. The patient was placed in the left lateral decubitus position. Procedure: Colonoscopy DETAILS OF PROCEDURE: Patient was taken to the procedure room where a time out was performed to confirm correct patient and correct procedure. The patient underwent monitored anesthesia care, which was administered by an anesthesia professional. The patient's blood pressure, heart rate, level of consciousness, oxygen, respirations and ECG were monitored throughout the procedure. A digital rectal exam was performed. The scope was introduced through the anus and advanced to the cecum. Retroflexion was performed in the rectum. The quality of bowel preparation was evaluated using the [facility] Bowel Preparation Scale with scores of: right colon = 2, transverse colon = 2, left colon = 2. The total BBPS score was 6. Bowel prep was adequate. The patient's estimated blood loss was minimal (<5 mL). The procedure was not difficult. The patient tolerated the procedure well. There were no apparent adverse events.  ANESTHESIA INFORMATION: ASA: II Anesthesia Type: IV Sedation with Anesthesia MEDICATIONS: Totals unavailable because the procedure time range is not set FINDINGS: Multiple medium, scattered diverticula of moderate severity with no inflammation in the ascending colon and sigmoid colon; no bleeding was identified The cecum, hepatic flexure, transverse colon, splenic flexure, descending colon, rectosigmoid and rectum appeared normal. One sessile polyp measuring smaller than 5 mm in the transverse colon; bleeding occurred after intervention; completely removed en bloc by cold snare and retrieved specimen Six sessile polyps measuring smaller than 5 mm in the descending colon; bleeding occurred after intervention; completely removed en bloc by cold snare and retrieved specimen Five sessile polyps measuring smaller than 5 mm in the proximal sigmoid colon; bleeding occurred after intervention; completely removed en bloc by cold snare and retrieved specimen Three polyps measuring smaller than 5 mm in the sigmoid colon 40 cm from the anal verge; completely removed en bloc by cold snare and retrieved specimen One sessile polyp measuring smaller than 5 mm in the sigmoid colon 30 cm from the anal verge; bleeding occurred after intervention; performed complete en bloc removal by cold forceps biopsy The cecum, ascending colon, hepatic flexure, splenic flexure, rectosigmoid and rectum appeared normal. EVENTS: Procedure Events Event Event Time ENDO CECUM REACHED 7/17/2023  8:59 AM ENDO SCOPE OUT TIME 7/17/2023  9:20 AM ENDO SCOPE OUT TIME 7/17/2023  9:21 AM SPECIMENS: ID Type Source Tests Collected by Time Destination 1 : antrum Tissue Stomach TISSUE EXAM Jonh Gut, DO 7/17/2023  8:51 AM  2 : transverse Tissue Polyp, Colorectal TISSUE EXAM Jonh Gut, DO 7/17/2023  9:01 AM  3 : descending x6 polyps Tissue Polyp, Colorectal TISSUE EXAM Jonh Gut, DO 7/17/2023  9:02 AM  4 : proximal sigmoid x5 polyps Tissue Polyp, Colorectal TISSUE EXAM Jonh Gut, DO 7/17/2023  9:21 AM  5 : 40cm x 3 polyps Tissue Polyp, Colorectal TISSUE EXAM Jonh Gut, DO 7/17/2023  9:22 AM  6 : 30cm x 2 polyps Tissue Polyp, Colorectal TISSUE EXAM Jonh Gut, DO 7/17/2023  9:22 AM  EQUIPMENT: Colonoscope -[id number]     Impression: Scattered diverticulosis of moderate severity in the ascending colon and sigmoid colon The cecum, hepatic flexure, transverse colon, splenic flexure, descending colon, rectosigmoid and rectum appeared normal. One sessile polyp measuring smaller than 5 mm in the transverse colon; bleeding occurred after intervention; removed by cold snare Six sessile polyps measuring smaller than 5 mm in the descending colon; bleeding occurred after intervention; removed by cold snare Five sessile polyps measuring smaller than 5 mm in the proximal sigmoid colon; bleeding occurred after intervention; removed by cold snare 3 subcentimeter polyps in the sigmoid colon were removed with cold snare One sessile polyp measuring smaller than 5 mm in the sigmoid colon; bleeding occurred after intervention; performed cold forceps biopsy The cecum, ascending colon, hepatic flexure, splenic flexure, rectosigmoid and rectum appeared normal. RECOMMENDATION:  Repeat colonoscopy in 1 year  Personal history of colon polyps    Alondra Day DO  [address] [place] [address] [phone] DATE OF SERVICE: 7/17/23 PHYSICIAN(S): Attending: Alondra Day DO Fellow: No Staff Documented INDICATION: Gastroesophageal reflux disease without esophagitis, History of colon polyps POST-OP DIAGNOSIS: See the impression below. HISTORY: Prior colonoscopy: 5 years ago. BOWEL PREPARATION: Miralax/Dulcolax PREPROCEDURE: Informed consent was obtained for the procedure, including sedation. Risks including but not limited to bleeding, infection, perforation, adverse drug reaction and aspiration were explained in detail. Also explained about less than 100% sensitivity with the exam and other alternatives. The patient was placed in the left lateral decubitus position. Procedure: Colonoscopy DETAILS OF PROCEDURE: Patient was taken to the procedure room where a time out was performed to confirm correct patient and correct procedure.  The patient underwent monitored anesthesia care, which was administered by an anesthesia professional. The patient's blood pressure, heart rate, level of consciousness, oxygen, respirations and ECG were monitored throughout the procedure. A digital rectal exam was performed. The scope was introduced through the anus and advanced to the cecum. Retroflexion was performed in the rectum. The quality of bowel preparation was evaluated using the [facility] Bowel Preparation Scale with scores of: right colon = 2, transverse colon = 2, left colon = 2. The total BBPS score was 6. Bowel prep was adequate. The patient's estimated blood loss was minimal (<5 mL). The procedure was not difficult. The patient tolerated the procedure well. There were no apparent adverse events.  ANESTHESIA INFORMATION: ASA: II Anesthesia Type: IV Sedation with Anesthesia MEDICATIONS: No administrations occurring from 0838 to 0919 on 07/17/23 FINDINGS: Multiple medium, scattered diverticula of moderate severity with no inflammation in the ascending colon and sigmoid colon; no bleeding was identified The cecum, hepatic flexure, transverse colon, splenic flexure, descending colon, rectosigmoid and rectum appeared normal. One sessile polyp measuring smaller than 5 mm in the transverse colon; bleeding occurred after intervention; completely removed en bloc by cold snare and retrieved specimen Six sessile polyps measuring smaller than 5 mm in the descending colon; bleeding occurred after intervention; completely removed en bloc by cold snare and retrieved specimen Five sessile polyps measuring smaller than 5 mm in the proximal sigmoid colon; bleeding occurred after intervention; completely removed en bloc by cold snare and retrieved specimen Three polyps measuring smaller than 5 mm in the sigmoid colon 40 cm from the anal verge; completely removed en bloc by cold snare and retrieved specimen One sessile polyp measuring smaller than 5 mm in the sigmoid colon 30 cm from the anal verge; bleeding occurred after intervention; performed complete en bloc removal by cold forceps biopsy The cecum, ascending colon, hepatic flexure, splenic flexure, rectosigmoid and rectum appeared normal. EVENTS: Procedure Events Event Event Time ENDO CECUM REACHED 7/17/2023  8:59 AM ENDO SCOPE OUT TIME 7/17/2023  9:20 AM ENDO SCOPE OUT TIME 7/17/2023  9:21 AM SPECIMENS: ID Type Source Tests Collected by Time Destination 1 : antrum Tissue Stomach TISSUE EXAM Rashid Leticia, DO 7/17/2023  8:51 AM  2 : transverse Tissue Polyp, Colorectal TISSUE EXAM Rashid Leticia, DO 7/17/2023  9:01 AM  3 : descending x6 polyps Tissue Polyp, Colorectal TISSUE EXAM Rashid Leticia, DO 7/17/2023  9:02 AM  4 : proximal sigmoid x5 polyps Tissue Polyp, Colorectal TISSUE EXAM Rashid Leticia, DO 7/17/2023  9:21 AM  5 : 40cm x 3 polyps Tissue Polyp, Colorectal TISSUE EXAM Rashid Leticia, DO 7/17/2023  9:22 AM  6 : 30cm x 2 polyps Tissue Polyp, Colorectal TISSUE EXAM Rashid Leticia, DO 7/17/2023  9:22 AM  EQUIPMENT: Colonoscope -[id number] IMPRESSION: Scattered diverticulosis of moderate severity in the ascending colon and sigmoid colon The cecum, hepatic flexure, transverse colon, splenic flexure, descending colon, rectosigmoid and rectum appeared normal. One sessile polyp measuring smaller than 5 mm in the transverse colon; bleeding occurred after intervention; removed by cold snare Six sessile polyps measuring smaller than 5 mm in the descending colon; bleeding occurred after intervention; removed by cold snare Five sessile polyps measuring smaller than 5 mm in the proximal sigmoid colon; bleeding occurred after intervention; removed by cold snare 3 subcentimeter polyps in the sigmoid colon were removed with cold snare One sessile polyp measuring smaller than 5 mm in the sigmoid colon; bleeding occurred after intervention; performed cold forceps biopsy The cecum, ascending colon, hepatic flexure, splenic flexure, rectosigmoid and rectum appeared normal. RECOMMENDATION:  Repeat colonoscopy in 1 year  Personal history of colon polyps    DO Michelle Davidson [place]    EGD    Add[facility]um Date: [facility] [address] [address] [phone] DATE OF SERVICE: 7/17/23 PHYSICIAN(S): Attending: Chicho Law DO, Precious Haller, FACP Fellow: No Staff Documented INDICATION: Gastroesophageal reflux disease without esophagitis, History of colon polyps POST-OP DIAGNOSIS: See the impression below. PREPROCEDURE: Informed consent was obtained for the procedure, including sedation. Risks of perforation, hemorrhage, adverse drug reaction and aspiration were discussed. The patient was placed in the left lateral decubitus position. Patient was explained about the risks and benefits of the procedure. Risks including but not limited to bleeding, infection, and perforation were explained in detail. Also explained about less than 100% sensitivity with the exam and other alternatives. PROCEDURE: EGD DETAILS OF PROCEDURE: Patient was taken to the procedure room where a time out was performed to confirm correct patient and correct procedure. The patient underwent monitored anesthesia care, which was administered by an anesthesia professional. The patient's blood pressure, heart rate, level of consciousness, respirations and oxygen were monitored throughout the procedure. The scope was advanced to the second part of the duodenum. Retroflexion was performed in the cardia and fundus. Prior to the procedure, the patient's H. Pylori status was unknown. The patient's estimated blood loss was minimal (<5 mL). The procedure was not difficult. The patient tolerated the procedure well. There were no apparent adverse events. ANESTHESIA INFORMATION: ASA: II Anesthesia Type: IV Sedation with Anesthesia MEDICATIONS: Totals unavailable because the procedure time range is not set FINDINGS: The cricopharynx, upper third of the esophagus, middle third of the esophagus, lower third of the esophagus, GE junction and Z-line appeared normal. Z-line is 40 cm from the incisors.  The duodenal bulb and 2nd part of the duodenum appeared normal. Erythematous mucosa in the antrum and prepyloric region; performed 6 cold forceps biopsies to rule out H. pylori The cardia, fundus of the stomach, body of the stomach and incisura appeared normal. SPECIMENS: ID Type Source Tests Collected by Time Destination 1 : antrum Tissue Stomach TISSUE EXAM Chicho Law DO 7/17/2023  8:51 AM  IMPRESSION: The cricopharynx, upper third of the esophagus, middle third of the esophagus, lower third of the esophagus, GE junction and Z-line appeared normal. The duodenal bulb and 2nd part of the duodenum appeared normal. Erythematous mucosa in the antrum and prepyloric region; performed cold forceps biopsies The cardia, fundus of the stomach, body of the stomach and incisura appeared normal. RECOMMENDATION: 1. We will call the patient in 1 to 2 weeks with results of the biopsies   Chicho Law [place], [place]    Result Date: 7/17/2023  Narrative: Table formatting from the original result was not included. [address] [place] [address] Endoscopy 2021 [facility] [id number] [phone] DATE OF SERVICE: 7/17/23 PHYSICIAN(S): Attending: Chicho Law DO, Precious Haller, FACP Fellow: No Staff Documented INDICATION: Gastroesophageal reflux disease without esophagitis, History of colon polyps POST-OP DIAGNOSIS: See the impression below. PREPROCEDURE: Informed consent was obtained for the procedure, including sedation. Risks of perforation, hemorrhage, adverse drug reaction and aspiration were discussed. The patient was placed in the left lateral decubitus position. Patient was explained about the risks and benefits of the procedure. Risks including but not limited to bleeding, infection, and perforation were explained in detail. Also explained about less than 100% sensitivity with the exam and other alternatives.  PROCEDURE: EGD DETAILS OF PROCEDURE: Patient was taken to the procedure room where a time out was performed to confirm correct patient and correct procedure. The patient underwent monitored anesthesia care, which was administered by an anesthesia professional. The patient's blood pressure, heart rate, level of consciousness, respirations and oxygen were monitored throughout the procedure. The scope was advanced to the second part of the duodenum. Retroflexion was performed in the fundus. The patient's estimated blood loss was minimal (<5 mL). The procedure was not difficult. The patient tolerated the procedure well. There were no apparent adverse events. ANESTHESIA INFORMATION: ASA: II Anesthesia Type: IV Sedation with Anesthesia MEDICATIONS: Totals unavailable because the procedure time range is not set FINDINGS: The cricopharynx, upper third of the esophagus, middle third of the esophagus, lower third of the esophagus, GE junction and Z-line appeared normal. Z-line is 40 cm from the incisors. The incisura, antrum, prepyloric region and pylorus appeared normal. Ten or more polyps measuring smaller than 5 mm in the fundus of the stomach and body of the stomach; performed cold forceps biopsy The duodenal bulb and 2nd part of the duodenum appeared normal. SPECIMENS: * No specimens in log *     Impression: The cricopharynx, upper third of the esophagus, middle third of the esophagus, lower third of the esophagus, GE junction and Z-line appeared normal. The incisura, antrum, prepyloric region and pylorus appeared normal. 10 or more subcentimeter polyps in the fundus of the stomach and body of the stomach were removed with cold forceps biopsy The duodenal bulb and 2nd part of the duodenum appeared normal. RECOMMENDATION: 1.   We will call the patient in 1 to 2 weeks with results of the biopsies   Evelin Abreu DO, [place], [place]

## 2024-08-02 DIAGNOSIS — K21.9 GASTROESOPHAGEAL REFLUX DISEASE WITHOUT ESOPHAGITIS: ICD-10-CM

## 2024-08-02 RX ORDER — OMEPRAZOLE 20 MG/1
20 CAPSULE, DELAYED RELEASE ORAL 2 TIMES DAILY
Qty: 30 CAPSULE | Refills: 0 | Status: SHIPPED | OUTPATIENT
Start: 2024-08-02

## 2024-08-16 ENCOUNTER — TELEPHONE (OUTPATIENT)
Age: 59
End: 2024-08-16

## 2024-08-16 DIAGNOSIS — K21.9 GASTROESOPHAGEAL REFLUX DISEASE WITHOUT ESOPHAGITIS: ICD-10-CM

## 2024-08-16 NOTE — TELEPHONE ENCOUNTER
Patient scheduled for a follow up appt and need courtesy refill. Transferred over to refill line to further assist

## 2024-08-16 NOTE — TELEPHONE ENCOUNTER
Reason for call:   [x] Refill   [] Prior Auth  [] Other:     Office:   [x] PCP/Provider -   [] Specialty/Provider -     Medication:     omeprazole (PriLOSEC) 20 mg delayed release capsule       Dose/Frequency: TAKE 1 CAPSULE TWICE A DAY     Quantity: 30 capsule     Pharmacy: [facility] - [address] [phone]     Does the patient have enough for 3 days?   [] Yes   [x] No - Send as HP to POD

## 2024-08-21 NOTE — TELEPHONE ENCOUNTER
Patient called and stated only a 30 day supply was sent to [facility] and not a 90 day supply.  Patient is asking for the 90 day supply to please be sent to local [facility] pharmacy since patient will run out of medication and has an appointment scheduled for 9/30/24.

## 2024-09-30 ENCOUNTER — OFFICE VISIT (OUTPATIENT)
Dept: GASTROENTEROLOGY | Facility: CLINIC | Age: 59
End: 2024-09-30
Payer: COMMERCIAL

## 2024-09-30 VITALS
HEART RATE: 74 BPM | HEIGHT: 66 IN | BODY MASS INDEX: 28.35 KG/M2 | TEMPERATURE: 97.4 F | SYSTOLIC BLOOD PRESSURE: 129 MMHG | WEIGHT: 176.4 LBS | OXYGEN SATURATION: 97 % | DIASTOLIC BLOOD PRESSURE: 88 MMHG

## 2024-09-30 DIAGNOSIS — Z86.0100 HISTORY OF COLON POLYPS: Primary | ICD-10-CM

## 2024-09-30 DIAGNOSIS — K21.9 GASTROESOPHAGEAL REFLUX DISEASE WITHOUT ESOPHAGITIS: ICD-10-CM

## 2024-09-30 PROCEDURE — 99214 OFFICE O/P EST MOD 30 MIN: CPT | Performed by: INTERNAL MEDICINE

## 2024-09-30 RX ORDER — FEXOFENADINE HCL 180 MG/1
180 TABLET ORAL DAILY
COMMUNITY
Start: 2024-06-04

## 2024-10-01 NOTE — PROGRESS NOTES
[facility] - Outpatient Follow-up Note  Juan Cevallos 59 y.o. female MRN: [id number]  Encounter: [id number]          ASSESSMENT AND PLAN:      1. History of colon polyps  -Next colonoscopy is due in 2026    2. Gastroesophageal reflux disease without esophagitis  -No lying down within 1 hour of snacking or eating  - omeprazole (PriLOSEC) 20 mg delayed release capsule; Take 1 capsule (20 mg total) by mouth daily  Dispense: 93 capsule; Refill: 3    ______________________________________________________________________    SUBJECTIVE: Juan comes the office today for routine follow-up.  She was requiring a refill of her omeprazole 40 mg which she is taking on a daily basis in the morning time.  She stated that the [place] for this appointment.  She states her heartburn occurs sometimes but is not a constant problem.  She denies dysphagia or odynophagia.  She denies abdominal pain, nausea, vomiting, diarrhea, constipation, bloating, gaseousness.  She does not eat late at night before she goes to bed.  In fact she states that she does not eat after 6 PM.    EGD performed on 7/17/2023 was normal..  Biopsies were taken of the antrum and lower found to be negative for cancer or Helicobacter pylori.  No family history for colon cancer.        Historical Information   Past Medical History:   Diagnosis Date    Colon polyps     GERD (gastroesophageal reflux disease)      Past Surgical History:   Procedure Laterality Date    CARPAL TUNNEL RELEASE      ORIF FEMUR FRACTURE Left      Social History   Social History     Substance and Sexual Activity   Alcohol Use Yes     Social History     Substance and Sexual Activity   Drug Use Not Currently     Social History     Tobacco Use   Smoking Status Every Day   Smokeless Tobacco Never     Family History   Problem Relation Age of Onset    Heart disease Father        Meds/Allergies       Current Outpatient Medications:     ascorbic acid (VITAMIN C) 500 MG  "tablet    calcium carbonate (OS-JE) 1250 (500 Ca) MG chewable tablet    Cholecalciferol 50 MCG (2000 UT) CAPS    fexofenadine (ALLEGRA) 180 MG tablet    Magnesium Gluconate 550 MG TABS    omeprazole (PriLOSEC) 20 mg delayed release capsule    omeprazole (PriLOSEC) 20 mg delayed release capsule    rosuvastatin (CRESTOR) 5 mg tablet    Sodium Fluoride 5000 Sensitive 1.1-5 % GEL    Allergies   Allergen Reactions    Ferrous Sulfate Diarrhea     Explosive diarrhea            Objective     Blood pressure 129/88, pulse 74, temperature (!) 97.4 °F (36.3 °C), temperature source Temporal, height 5' 6\" (1.676 m), weight 80 kg (176 lb 6.4 oz), SpO2 97%. Body mass index is 28.47 kg/m².      PHYSICAL EXAM:      General Appearance:   Alert, cooperative, no distress   HEENT:   Normocephalic, atraumatic, anicteric.     Neck:  Supple, symmetrical, trachea midline   Lungs:   Clear to auscultation bilaterally; no rales, rhonchi or wheezing; respirations unlabored    Heart::   Regular rate and rhythm; no murmur, rub, or gallop.   Abdomen:   Soft, non-tender, non-distended; normal bowel sounds; no masses, no organomegaly    Genitalia:   Deferred    Rectal:   Deferred    Extremities:  No cyanosis, clubbing or edema    Pulses:  2+ and symmetric    Skin:  No jaundice, rashes, or lesions    Lymph nodes:  No palpable cervical lymphadenopathy        Lab Results:   No visits with results within 1 Day(s) from this visit.   Latest known visit with results is:   Hospital Outpatient Visit on 07/17/2023   Component Date Value    Case Report 07/17/2023                      Value:Surgical Pathology Report                         Case: [id number]                                   Authorizing Provider:  Kyaw Ng DO          Collected:           07/17/2023 0851              Ordering Location:      [facility]       Received:            07/17/2023 [address] [place] [address] Endoscopy                                       "                       Pathologist:           Geovanny Hall MD                                                     Specimens:   A) - Stomach, antrum                                                                                B) - Polyp, Colorectal, transverse                                                                  C) - Polyp, Colorectal, descending x6 polyps                                                        D) - Polyp, Colorectal, proximal sigmoid x5 polyps                                                  E) - Polyp, Colorectal, 40cm x 3 polyps                                                                                       F) - Polyp, Colorectal, 30cm x 2 polyps                                                    Final Diagnosis 07/17/2023                      Value:A. Stomach, antrum:                          -  Chronic inactive oxyntic and antral gastritis and intestinal                           metaplasia.                          -  Negative for Helicobacter pylori, by H&E stain.                          -  Negative for dysplasia or carcinoma.                                                    B. Polyp, Colorectal, transverse:                          -  Benign colonic mucosa.                          -  Negative for dysplasia.                                                     C. Polyp, Colorectal, descending x6 polyps:                          -  Portions of hyperplastic polyp.                           -  Negative for dysplasia.                                                     D. Polyp, Colorectal, proximal sigmoid x5 polyps:                          -  Portions of hyperplastic polyp and colonic mucosa with lymphoid                           aggregate.                           -  Negative for dysplasia.                                                     E. Polyp, Colorectal, 40cm x 3 polyps:                            - Tubular adenoma and hyperplastic polyp.                              - Negative for high grade dysplasia.                                                     F. Polyp, Colorectal, 30cm x 2 polyps:                          -  Portions of hyperplastic polyp.                           -  Negative for dysplasia.     Additional Information 07/17/2023                      Value:All reported additional testing was performed with appropriately reactive                           controls.  These tests were developed and their performance                           characteristics determined by [facility] or                           appropriate performing facility, though some tests may be performed on                           tissues which have not been validated for performance characteristics                           (such as staining performed on alcohol exposed cell blocks and decalcified                           tissues).  Results should be interpreted with caution and in the context                           of the patients’ clinical condition. These tests may not be cleared or                           approved by the U.S. Food and Drug Administration, though the FDA has                           determined that such clearance or approval is not necessary. These tests                           are used for clinical purposes and they should not be regarded as                           investigational or for research. This laboratory has been approved by CLIA                           88, designated as a high-complexity laboratory and is qualified to perform                           these tests.    Synoptic Checklist 07/17/2023                      Value:                            COLON/RECTUM POLYP FORM - GI - E                                                                                     :    Adenoma(s)      Gross Description 07/17/2023                      Value:A. The specimen is received in formalin, labeled with the patient's name         "                   and hospital number, and is designated \" stomach antrum\".  The specimen                           consists of 5 tan-pink soft tissue fragments each measuring 0.3 cm in                           greatest dimension.  Entirely submitted. One screened cassette.                          B. The specimen is received in formalin, labeled with the patient's name                           and hospital number, and is designated \" transverse colon polyp\".  The                           specimen consists of 1 tan-pink soft tissue fragment measuring 0.4 cm in                           greatest dimension.  Entirely submitted. One screened cassette.                          C. The specimen is received in formalin, labeled with the patient's name                           and hospital number, and is designated \" descending colon polyps x 6\".                            The specimen consists of multiple (greater than 6), soft tissue fragments                           measuring in aggregate 1.3 x 1.2 x 0.3 cm.  Entirely submitted. One                           screened cassette.                          D. The specimen is received in formalin, labeled with the patient's name                           and hospital number, and is designated \" proximal sigmoid colon polyps x                           5\".  The specimen consists of 5 tan-pink soft tissue fragments ranging                           from 0.2 cm to 1.0 cm in greatest dimension.  Entirely submitted. One                           screened cassette.                          E. The specimen is received in formalin, labeled with the patient's name                           and hospital number, and is designated \" colorectal polyps x 3 at 40 cm\".                            The specimen consists of 3 tan-pink soft tissue fragments ranging from 0.3                           cm to 0.9 cm in greatest dimension.  Entirely submitted. One screened                        " "   cassette.                          F. The specimen is received in formalin, labeled with the patient's name                           and hospital number, and is designated \" colorectal polyps x 2 at 30 cm\".                            The specimen consists of 2 tan-pink soft tissue fragments measuring 0.3 cm                           and 0.4 cm in greatest dimension.  Entirely submitted. One screened                           cassette.                                                    Note: The estimated total formalin fixation time based upon information                           provided by the submitting clinician and the standard processing schedule                           is under 72 hours.  [place]    Clinical Information 07/17/2023                      Value:Cold bx eval h pylori         Radiology Results:   No results found.  "

## 2024-11-11 DIAGNOSIS — K21.9 GASTROESOPHAGEAL REFLUX DISEASE WITHOUT ESOPHAGITIS: ICD-10-CM

## 2025-05-08 DIAGNOSIS — K21.9 GASTROESOPHAGEAL REFLUX DISEASE WITHOUT ESOPHAGITIS: ICD-10-CM

## 2025-05-08 RX ORDER — OMEPRAZOLE 20 MG/1
20 CAPSULE, DELAYED RELEASE ORAL DAILY
Qty: 90 CAPSULE | Refills: 1 | Status: SHIPPED | OUTPATIENT
Start: 2025-05-08

## 2025-08-08 ENCOUNTER — OFFICE VISIT (OUTPATIENT)
Dept: GASTROENTEROLOGY | Facility: CLINIC | Age: 60
End: 2025-08-08
Payer: COMMERCIAL

## 2025-08-08 VITALS
HEIGHT: 66 IN | WEIGHT: 179 LBS | HEART RATE: 68 BPM | BODY MASS INDEX: 28.77 KG/M2 | OXYGEN SATURATION: 99 % | DIASTOLIC BLOOD PRESSURE: 68 MMHG | TEMPERATURE: 97.5 F | SYSTOLIC BLOOD PRESSURE: 108 MMHG

## 2025-08-08 DIAGNOSIS — K21.9 GASTROESOPHAGEAL REFLUX DISEASE WITHOUT ESOPHAGITIS: Primary | ICD-10-CM

## 2025-08-08 PROCEDURE — 99214 OFFICE O/P EST MOD 30 MIN: CPT | Performed by: INTERNAL MEDICINE

## 2025-08-08 RX ORDER — DEXTROMETHORPHAN HYDROBROMIDE AND PROMETHAZINE HYDROCHLORIDE 15; 6.25 MG/5ML; MG/5ML
SYRUP ORAL
COMMUNITY
Start: 2025-07-14

## 2025-08-08 RX ORDER — OMEPRAZOLE 20 MG/1
20 CAPSULE, DELAYED RELEASE ORAL DAILY
Qty: 93 CAPSULE | Refills: 3 | Status: SHIPPED | OUTPATIENT
Start: 2025-08-08

## 2025-08-08 RX ORDER — CYCLOSPORINE 0.5 MG/ML
1 EMULSION OPHTHALMIC EVERY 12 HOURS
COMMUNITY
Start: 2025-05-19